# Patient Record
Sex: FEMALE | Race: WHITE | NOT HISPANIC OR LATINO | URBAN - METROPOLITAN AREA
[De-identification: names, ages, dates, MRNs, and addresses within clinical notes are randomized per-mention and may not be internally consistent; named-entity substitution may affect disease eponyms.]

---

## 2019-02-20 ENCOUNTER — OUTPATIENT (OUTPATIENT)
Dept: OUTPATIENT SERVICES | Facility: HOSPITAL | Age: 64
LOS: 1 days | End: 2019-02-20
Payer: MEDICARE

## 2019-02-20 VITALS
SYSTOLIC BLOOD PRESSURE: 100 MMHG | RESPIRATION RATE: 16 BRPM | OXYGEN SATURATION: 97 % | DIASTOLIC BLOOD PRESSURE: 57 MMHG | TEMPERATURE: 98 F | HEIGHT: 67 IN | HEART RATE: 87 BPM | WEIGHT: 190.92 LBS

## 2019-02-20 VITALS — HEIGHT: 67 IN | WEIGHT: 190.92 LBS

## 2019-02-20 DIAGNOSIS — Z98.890 OTHER SPECIFIED POSTPROCEDURAL STATES: Chronic | ICD-10-CM

## 2019-02-20 DIAGNOSIS — M21.962 UNSPECIFIED ACQUIRED DEFORMITY OF LEFT LOWER LEG: ICD-10-CM

## 2019-02-20 DIAGNOSIS — M21.6X2 OTHER ACQUIRED DEFORMITIES OF LEFT FOOT: ICD-10-CM

## 2019-02-20 DIAGNOSIS — M79.672 PAIN IN LEFT FOOT: ICD-10-CM

## 2019-02-20 DIAGNOSIS — M20.12 HALLUX VALGUS (ACQUIRED), LEFT FOOT: ICD-10-CM

## 2019-02-20 DIAGNOSIS — Z01.818 ENCOUNTER FOR OTHER PREPROCEDURAL EXAMINATION: ICD-10-CM

## 2019-02-20 LAB
ANION GAP SERPL CALC-SCNC: 8 MMOL/L — SIGNIFICANT CHANGE UP (ref 5–17)
BUN SERPL-MCNC: 14 MG/DL — SIGNIFICANT CHANGE UP (ref 7–23)
CALCIUM SERPL-MCNC: 9.1 MG/DL — SIGNIFICANT CHANGE UP (ref 8.4–10.5)
CHLORIDE SERPL-SCNC: 101 MMOL/L — SIGNIFICANT CHANGE UP (ref 96–108)
CO2 SERPL-SCNC: 29 MMOL/L — SIGNIFICANT CHANGE UP (ref 22–31)
CREAT SERPL-MCNC: 1.05 MG/DL — SIGNIFICANT CHANGE UP (ref 0.5–1.3)
GLUCOSE SERPL-MCNC: 98 MG/DL — SIGNIFICANT CHANGE UP (ref 70–99)
HCT VFR BLD CALC: 42.1 % — SIGNIFICANT CHANGE UP (ref 34.5–45)
HGB BLD-MCNC: 12.6 G/DL — SIGNIFICANT CHANGE UP (ref 11.5–15.5)
MCHC RBC-ENTMCNC: 24.3 PG — LOW (ref 27–34)
MCHC RBC-ENTMCNC: 30 GM/DL — LOW (ref 32–36)
MCV RBC AUTO: 81 FL — SIGNIFICANT CHANGE UP (ref 80–100)
PLATELET # BLD AUTO: 353 K/UL — SIGNIFICANT CHANGE UP (ref 150–400)
POTASSIUM SERPL-MCNC: 3.7 MMOL/L — SIGNIFICANT CHANGE UP (ref 3.5–5.3)
POTASSIUM SERPL-SCNC: 3.7 MMOL/L — SIGNIFICANT CHANGE UP (ref 3.5–5.3)
RBC # BLD: 5.2 M/UL — SIGNIFICANT CHANGE UP (ref 3.8–5.2)
RBC # FLD: 15.9 % — HIGH (ref 10.3–14.5)
SODIUM SERPL-SCNC: 138 MMOL/L — SIGNIFICANT CHANGE UP (ref 135–145)
WBC # BLD: 8.3 K/UL — SIGNIFICANT CHANGE UP (ref 3.8–10.5)
WBC # FLD AUTO: 8.3 K/UL — SIGNIFICANT CHANGE UP (ref 3.8–10.5)

## 2019-02-20 PROCEDURE — 93005 ELECTROCARDIOGRAM TRACING: CPT

## 2019-02-20 PROCEDURE — 93010 ELECTROCARDIOGRAM REPORT: CPT | Mod: NC

## 2019-02-20 PROCEDURE — 36415 COLL VENOUS BLD VENIPUNCTURE: CPT

## 2019-02-20 PROCEDURE — G0463: CPT

## 2019-02-20 PROCEDURE — 80048 BASIC METABOLIC PNL TOTAL CA: CPT

## 2019-02-20 PROCEDURE — 85027 COMPLETE CBC AUTOMATED: CPT

## 2019-02-20 NOTE — H&P PST ADULT - ATTENDING COMMENTS
Physician Assistant Statement 03-01-19  I have personally seen and interviewed the patient. There have NOT been any changes in the patient's history or review of systems since PMD visit.

## 2019-02-20 NOTE — H&P PST ADULT - PSH
History of facelift  2013  S/P bilateral breast reduction  09/2018  S/P bunionectomy  left foot 2012  S/P Hysterectomy  2006  S/P Tonsillectomy  1962  S/P Tubal Ligation  1996

## 2019-02-20 NOTE — H&P PST ADULT - HISTORY OF PRESENT ILLNESS
62 y/o female presents for PST. As per patient to deformities to left foot and toes she has been experiencing pain and spasm to left foot. Patient has been using Tylenol for pain which gives some relief.

## 2019-02-20 NOTE — H&P PST ADULT - RS GEN PE MLT RESP DETAILS PC
breath sounds equal/no chest wall tenderness/clear to auscultation bilaterally/respirations non-labored/airway patent/good air movement/normal

## 2019-02-20 NOTE — H&P PST ADULT - NEGATIVE CARDIOVASCULAR SYMPTOMS
no palpitations/no claudication/no orthopnea/no paroxysmal nocturnal dyspnea/no peripheral edema/no chest pain

## 2019-02-20 NOTE — H&P PST ADULT - FAMILY HISTORY
Mother  Still living? No  Family history of multiple myeloma, Age at diagnosis: Age Unknown  Family history of diabetes mellitus, Age at diagnosis: Age Unknown  Family history of hypertension, Age at diagnosis: Age Unknown     Father  Still living? No  Family history of hypertension, Age at diagnosis: Age Unknown  Family history of lung cancer, Age at diagnosis: Age Unknown

## 2019-02-20 NOTE — H&P PST ADULT - PMH
Anxiety    Deformity of foot, left    Depression, Major    Hyperlipidemia    Hypertension    Mitral Valve Prolapse    OCD (Obsessive Compulsive Disorder)    Uterine fibroid

## 2019-02-20 NOTE — H&P PST ADULT - NSANTHOSAYNRD_GEN_A_CORE
No. PABLITO screening performed.  STOP BANG Legend: 0-2 = LOW Risk; 3-4 = INTERMEDIATE Risk; 5-8 = HIGH Risk

## 2019-02-28 ENCOUNTER — TRANSCRIPTION ENCOUNTER (OUTPATIENT)
Age: 64
End: 2019-02-28

## 2019-03-01 ENCOUNTER — OUTPATIENT (OUTPATIENT)
Dept: OUTPATIENT SERVICES | Facility: HOSPITAL | Age: 64
LOS: 1 days | End: 2019-03-01
Payer: MEDICARE

## 2019-03-01 ENCOUNTER — RESULT REVIEW (OUTPATIENT)
Age: 64
End: 2019-03-01

## 2019-03-01 VITALS
WEIGHT: 190.92 LBS | HEART RATE: 85 BPM | OXYGEN SATURATION: 97 % | SYSTOLIC BLOOD PRESSURE: 104 MMHG | RESPIRATION RATE: 14 BRPM | DIASTOLIC BLOOD PRESSURE: 71 MMHG | HEIGHT: 67 IN | TEMPERATURE: 98 F

## 2019-03-01 VITALS
RESPIRATION RATE: 16 BRPM | OXYGEN SATURATION: 97 % | SYSTOLIC BLOOD PRESSURE: 128 MMHG | TEMPERATURE: 98 F | DIASTOLIC BLOOD PRESSURE: 77 MMHG | HEART RATE: 77 BPM

## 2019-03-01 DIAGNOSIS — Z98.890 OTHER SPECIFIED POSTPROCEDURAL STATES: Chronic | ICD-10-CM

## 2019-03-01 DIAGNOSIS — M79.672 PAIN IN LEFT FOOT: ICD-10-CM

## 2019-03-01 DIAGNOSIS — M20.12 HALLUX VALGUS (ACQUIRED), LEFT FOOT: ICD-10-CM

## 2019-03-01 DIAGNOSIS — M21.6X2 OTHER ACQUIRED DEFORMITIES OF LEFT FOOT: ICD-10-CM

## 2019-03-01 PROCEDURE — 88311 DECALCIFY TISSUE: CPT | Mod: 26

## 2019-03-01 PROCEDURE — C1713: CPT

## 2019-03-01 PROCEDURE — 73620 X-RAY EXAM OF FOOT: CPT | Mod: 26,LT

## 2019-03-01 PROCEDURE — C1889: CPT

## 2019-03-01 PROCEDURE — 28298 COR HLX VLGS PRX PHLX OSTEOT: CPT | Mod: LT

## 2019-03-01 PROCEDURE — 88311 DECALCIFY TISSUE: CPT

## 2019-03-01 PROCEDURE — 73620 X-RAY EXAM OF FOOT: CPT

## 2019-03-01 PROCEDURE — 88304 TISSUE EXAM BY PATHOLOGIST: CPT

## 2019-03-01 PROCEDURE — 28080 REMOVAL OF FOOT LESION: CPT | Mod: T1

## 2019-03-01 PROCEDURE — 88304 TISSUE EXAM BY PATHOLOGIST: CPT | Mod: 26

## 2019-03-01 PROCEDURE — 28285 REPAIR OF HAMMERTOE: CPT | Mod: T1

## 2019-03-01 RX ORDER — OXYCODONE HYDROCHLORIDE 5 MG/1
10 TABLET ORAL ONCE
Qty: 0 | Refills: 0 | Status: DISCONTINUED | OUTPATIENT
Start: 2019-03-01 | End: 2019-03-01

## 2019-03-01 RX ORDER — OMEGA-3 ACID ETHYL ESTERS 1 G
0 CAPSULE ORAL
Qty: 0 | Refills: 0 | COMMUNITY

## 2019-03-01 RX ORDER — SODIUM CHLORIDE 9 MG/ML
1000 INJECTION, SOLUTION INTRAVENOUS
Qty: 0 | Refills: 0 | Status: DISCONTINUED | OUTPATIENT
Start: 2019-03-01 | End: 2019-03-01

## 2019-03-01 RX ORDER — ONDANSETRON 8 MG/1
4 TABLET, FILM COATED ORAL ONCE
Qty: 0 | Refills: 0 | Status: DISCONTINUED | OUTPATIENT
Start: 2019-03-01 | End: 2019-03-01

## 2019-03-01 RX ADMIN — SODIUM CHLORIDE 50 MILLILITER(S): 9 INJECTION, SOLUTION INTRAVENOUS at 09:24

## 2019-03-01 NOTE — BRIEF OPERATIVE NOTE - PROCEDURE
<<-----Click on this checkbox to enter Procedure Arthroplasty, toe, IP joint  03/01/2019    Active  [email or web address]  Neuroma excision  03/01/2019    Active  [email or web address]  [place] osteotomy of left great toe  03/01/2019    Active  [email or web address]  Weil osteotomy of left foot  03/01/2019    Active  [email or web address]

## 2019-03-01 NOTE — ASU DISCHARGE PLAN (ADULT/PEDIATRIC). - NOTIFY
Bleeding that does not stop/Pain not relieved by Medications Persistent Nausea and Vomiting/Increased Irritability or Sluggishness/Numbness, tingling/Inability to Tolerate Liquids or Foods/Fever greater than 101/Bleeding that does not stop/Numbness, color, or temperature change to extremity/Pain not relieved by Medications/Swelling that continues

## 2019-03-01 NOTE — ASU DISCHARGE PLAN (ADULT/PEDIATRIC). - NURSING INSTRUCTIONS
all discharge safety follow up care to MD. Take all pain meds with food and not on an empty stomach.

## 2019-03-01 NOTE — BRIEF OPERATIVE NOTE - PRE-OP DX
Hammertoe of left foot  03/01/2019    Active  Ruchi Mckeon  Neuroma digital nerve, left  03/01/2019    Active  Ruchi Mckeon  Plantarflexion deformity of left foot  03/01/2019    Active  Ruchi Mckeon

## 2019-03-01 NOTE — ASU PATIENT PROFILE, ADULT - PMH
Anxiety    Deformity of foot, left    Depression, Major    Hyperlipidemia    Hypertension    Mitral Valve Prolapse    OCD (Obsessive Compulsive Disorder)    Uterine fibroid Anxiety    Bilateral carotid artery stenosis    Chronic back pain    Deformity of foot, left    Depression, Major    Fibromyalgia    GERD (gastroesophageal reflux disease)    Hyperlipidemia    Hypertension    Lung nodule    Mitral Valve Prolapse    OCD (Obsessive Compulsive Disorder)    Uterine fibroid

## 2019-03-04 LAB — SURGICAL PATHOLOGY STUDY: SIGNIFICANT CHANGE UP

## 2019-05-03 PROBLEM — I65.23 OCCLUSION AND STENOSIS OF BILATERAL CAROTID ARTERIES: Chronic | Status: ACTIVE | Noted: 2019-03-01

## 2019-05-03 PROBLEM — D25.9 LEIOMYOMA OF UTERUS, UNSPECIFIED: Chronic | Status: ACTIVE | Noted: 2019-02-20

## 2019-05-03 PROBLEM — M54.9 DORSALGIA, UNSPECIFIED: Chronic | Status: ACTIVE | Noted: 2019-03-01

## 2019-05-03 PROBLEM — M21.962 UNSPECIFIED ACQUIRED DEFORMITY OF LEFT LOWER LEG: Chronic | Status: ACTIVE | Noted: 2019-02-20

## 2019-05-03 PROBLEM — R91.1 SOLITARY PULMONARY NODULE: Chronic | Status: ACTIVE | Noted: 2019-03-01

## 2019-05-03 PROBLEM — M79.7 FIBROMYALGIA: Chronic | Status: ACTIVE | Noted: 2019-03-01

## 2019-05-03 PROBLEM — K21.9 GASTRO-ESOPHAGEAL REFLUX DISEASE WITHOUT ESOPHAGITIS: Chronic | Status: ACTIVE | Noted: 2019-03-01

## 2019-05-07 ENCOUNTER — OUTPATIENT (OUTPATIENT)
Dept: OUTPATIENT SERVICES | Facility: HOSPITAL | Age: 64
LOS: 1 days | End: 2019-05-07
Payer: MEDICARE

## 2019-05-07 VITALS
SYSTOLIC BLOOD PRESSURE: 114 MMHG | HEART RATE: 92 BPM | WEIGHT: 193.12 LBS | RESPIRATION RATE: 16 BRPM | TEMPERATURE: 98 F | DIASTOLIC BLOOD PRESSURE: 73 MMHG | HEIGHT: 66 IN | OXYGEN SATURATION: 98 %

## 2019-05-07 DIAGNOSIS — M65.872 OTHER SYNOVITIS AND TENOSYNOVITIS, LEFT ANKLE AND FOOT: ICD-10-CM

## 2019-05-07 DIAGNOSIS — Z01.818 ENCOUNTER FOR OTHER PREPROCEDURAL EXAMINATION: ICD-10-CM

## 2019-05-07 DIAGNOSIS — Z98.890 OTHER SPECIFIED POSTPROCEDURAL STATES: Chronic | ICD-10-CM

## 2019-05-07 DIAGNOSIS — Z47.2 ENCOUNTER FOR REMOVAL OF INTERNAL FIXATION DEVICE: ICD-10-CM

## 2019-05-07 LAB
ANION GAP SERPL CALC-SCNC: 6 MMOL/L — SIGNIFICANT CHANGE UP (ref 5–17)
BUN SERPL-MCNC: 11 MG/DL — SIGNIFICANT CHANGE UP (ref 7–23)
CALCIUM SERPL-MCNC: 9.7 MG/DL — SIGNIFICANT CHANGE UP (ref 8.4–10.5)
CHLORIDE SERPL-SCNC: 102 MMOL/L — SIGNIFICANT CHANGE UP (ref 96–108)
CO2 SERPL-SCNC: 31 MMOL/L — SIGNIFICANT CHANGE UP (ref 22–31)
CREAT SERPL-MCNC: 0.7 MG/DL — SIGNIFICANT CHANGE UP (ref 0.5–1.3)
GLUCOSE SERPL-MCNC: 114 MG/DL — HIGH (ref 70–99)
HCT VFR BLD CALC: 43.1 % — SIGNIFICANT CHANGE UP (ref 34.5–45)
HGB BLD-MCNC: 13 G/DL — SIGNIFICANT CHANGE UP (ref 11.5–15.5)
MCHC RBC-ENTMCNC: 24.4 PG — LOW (ref 27–34)
MCHC RBC-ENTMCNC: 30.2 GM/DL — LOW (ref 32–36)
MCV RBC AUTO: 80.8 FL — SIGNIFICANT CHANGE UP (ref 80–100)
PLATELET # BLD AUTO: 342 K/UL — SIGNIFICANT CHANGE UP (ref 150–400)
POTASSIUM SERPL-MCNC: 3.8 MMOL/L — SIGNIFICANT CHANGE UP (ref 3.5–5.3)
POTASSIUM SERPL-SCNC: 3.8 MMOL/L — SIGNIFICANT CHANGE UP (ref 3.5–5.3)
RBC # BLD: 5.33 M/UL — HIGH (ref 3.8–5.2)
RBC # FLD: 14.2 % — SIGNIFICANT CHANGE UP (ref 10.3–14.5)
SODIUM SERPL-SCNC: 139 MMOL/L — SIGNIFICANT CHANGE UP (ref 135–145)
WBC # BLD: 7.8 K/UL — SIGNIFICANT CHANGE UP (ref 3.8–10.5)
WBC # FLD AUTO: 7.8 K/UL — SIGNIFICANT CHANGE UP (ref 3.8–10.5)

## 2019-05-07 PROCEDURE — 36415 COLL VENOUS BLD VENIPUNCTURE: CPT

## 2019-05-07 PROCEDURE — 85027 COMPLETE CBC AUTOMATED: CPT

## 2019-05-07 PROCEDURE — G0463: CPT

## 2019-05-07 PROCEDURE — 80048 BASIC METABOLIC PNL TOTAL CA: CPT

## 2019-05-07 NOTE — H&P PST ADULT - NSICDXPROBLEM_GEN_ALL_CORE_FT
PROBLEM DIAGNOSES  Problem: Encounter for removal of internal fixation device  Assessment and Plan: Patient schedule for left foot removal of hardware 2nd metatarsal. Labs and medical clearance pending.

## 2019-05-07 NOTE — H&P PST ADULT - RS GEN PE MLT RESP DETAILS PC
no chest wall tenderness/respirations non-labored/airway patent/normal/clear to auscultation bilaterally/good air movement/breath sounds equal

## 2019-05-07 NOTE — H&P PST ADULT - NSICDXPASTMEDICALHX_GEN_ALL_CORE_FT
PAST MEDICAL HISTORY:  Anxiety     Bilateral carotid artery stenosis     Chronic back pain     Deformity of foot, left     Depression, Major     Fibromyalgia     GERD (gastroesophageal reflux disease)     Hyperlipidemia     Hypertension     Lung nodule     Mitral Valve Prolapse     OCD (Obsessive Compulsive Disorder)     Uterine fibroid

## 2019-05-07 NOTE — H&P PST ADULT - NSICDXFAMILYHX_GEN_ALL_CORE_FT
FAMILY HISTORY:  Father  Still living? No  Family history of hypertension, Age at diagnosis: Age Unknown  Family history of lung cancer, Age at diagnosis: Age Unknown    Mother  Still living? No  Family history of diabetes mellitus, Age at diagnosis: Age Unknown  Family history of hypertension, Age at diagnosis: Age Unknown  Family history of multiple myeloma, Age at diagnosis: Age Unknown

## 2019-05-07 NOTE — H&P PST ADULT - NEUROLOGICAL DETAILS
alert and oriented x 3/responds to pain/responds to verbal commands/no spontaneous movement/sensation intact/cranial nerves intact/deep reflexes intact

## 2019-05-07 NOTE — H&P PST ADULT - HISTORY OF PRESENT ILLNESS
62 y/o female presents for PST. As per patient she had left foot surgery due to deformities of left foot on 03/01/2019. Patient is now returning to have hardware removed.

## 2019-05-07 NOTE — H&P PST ADULT - NSICDXPASTSURGICALHX_GEN_ALL_CORE_FT
PAST SURGICAL HISTORY:  History of facelift 2013    S/P bilateral breast reduction 09/2018    S/P bunionectomy left foot 2012    S/P Hysterectomy 2006    S/P Tonsillectomy 1962    S/P Tubal Ligation 1996

## 2019-05-16 ENCOUNTER — TRANSCRIPTION ENCOUNTER (OUTPATIENT)
Age: 64
End: 2019-05-16

## 2019-05-17 ENCOUNTER — RESULT REVIEW (OUTPATIENT)
Age: 64
End: 2019-05-17

## 2019-05-17 ENCOUNTER — OUTPATIENT (OUTPATIENT)
Dept: OUTPATIENT SERVICES | Facility: HOSPITAL | Age: 64
LOS: 1 days | End: 2019-05-17
Payer: MEDICARE

## 2019-05-17 VITALS
HEART RATE: 90 BPM | RESPIRATION RATE: 18 BRPM | WEIGHT: 193.12 LBS | HEIGHT: 66 IN | TEMPERATURE: 98 F | DIASTOLIC BLOOD PRESSURE: 71 MMHG | SYSTOLIC BLOOD PRESSURE: 113 MMHG | OXYGEN SATURATION: 96 %

## 2019-05-17 VITALS
OXYGEN SATURATION: 99 % | DIASTOLIC BLOOD PRESSURE: 74 MMHG | TEMPERATURE: 97 F | SYSTOLIC BLOOD PRESSURE: 116 MMHG | RESPIRATION RATE: 16 BRPM | HEART RATE: 80 BPM

## 2019-05-17 DIAGNOSIS — Z98.890 OTHER SPECIFIED POSTPROCEDURAL STATES: Chronic | ICD-10-CM

## 2019-05-17 DIAGNOSIS — Z47.2 ENCOUNTER FOR REMOVAL OF INTERNAL FIXATION DEVICE: ICD-10-CM

## 2019-05-17 DIAGNOSIS — M65.872 OTHER SYNOVITIS AND TENOSYNOVITIS, LEFT ANKLE AND FOOT: ICD-10-CM

## 2019-05-17 PROCEDURE — 88304 TISSUE EXAM BY PATHOLOGIST: CPT | Mod: 26

## 2019-05-17 PROCEDURE — 20670 REMOVAL IMPLANT SUPERFICIAL: CPT

## 2019-05-17 PROCEDURE — 88305 TISSUE EXAM BY PATHOLOGIST: CPT | Mod: 26,XP

## 2019-05-17 PROCEDURE — 88300 SURGICAL PATH GROSS: CPT | Mod: 26

## 2019-05-17 PROCEDURE — 88305 TISSUE EXAM BY PATHOLOGIST: CPT

## 2019-05-17 PROCEDURE — 88304 TISSUE EXAM BY PATHOLOGIST: CPT | Mod: 26,XP

## 2019-05-17 PROCEDURE — 88300 SURGICAL PATH GROSS: CPT | Mod: 26,XP

## 2019-05-17 PROCEDURE — 73620 X-RAY EXAM OF FOOT: CPT

## 2019-05-17 PROCEDURE — 88305 TISSUE EXAM BY PATHOLOGIST: CPT | Mod: 26

## 2019-05-17 PROCEDURE — 73620 X-RAY EXAM OF FOOT: CPT | Mod: 26,LT

## 2019-05-17 PROCEDURE — 88300 SURGICAL PATH GROSS: CPT

## 2019-05-17 RX ORDER — CHOLECALCIFEROL (VITAMIN D3) 125 MCG
1 CAPSULE ORAL
Qty: 0 | Refills: 0 | DISCHARGE

## 2019-05-17 RX ORDER — CLOMIPRAMINE HYDROCHLORIDE 50 MG/1
1 CAPSULE ORAL
Qty: 0 | Refills: 0 | DISCHARGE

## 2019-05-17 RX ORDER — ALPRAZOLAM 0.25 MG
1 TABLET ORAL
Qty: 0 | Refills: 0 | DISCHARGE

## 2019-05-17 RX ORDER — ACETAMINOPHEN 500 MG
2 TABLET ORAL
Qty: 0 | Refills: 0 | DISCHARGE

## 2019-05-17 RX ORDER — SODIUM CHLORIDE 9 MG/ML
1000 INJECTION, SOLUTION INTRAVENOUS
Refills: 0 | Status: DISCONTINUED | OUTPATIENT
Start: 2019-05-17 | End: 2019-05-17

## 2019-05-17 RX ORDER — L.ACIDOPH/B.ANIMALIS/B.LONGUM 15B CELL
1 CAPSULE ORAL
Qty: 0 | Refills: 0 | DISCHARGE

## 2019-05-17 RX ORDER — SIMVASTATIN 20 MG/1
1 TABLET, FILM COATED ORAL
Qty: 0 | Refills: 0 | DISCHARGE

## 2019-05-17 RX ORDER — LOSARTAN/HYDROCHLOROTHIAZIDE 100MG-25MG
1 TABLET ORAL
Qty: 0 | Refills: 0 | DISCHARGE

## 2019-05-17 RX ORDER — MILK THISTLE 150 MG
1 CAPSULE ORAL
Qty: 0 | Refills: 0 | DISCHARGE

## 2019-05-17 RX ORDER — OMEPRAZOLE 10 MG/1
1 CAPSULE, DELAYED RELEASE ORAL
Qty: 0 | Refills: 0 | DISCHARGE

## 2019-05-17 RX ORDER — LANOLIN ALCOHOL/MO/W.PET/CERES
2 CREAM (GRAM) TOPICAL
Qty: 0 | Refills: 0 | DISCHARGE

## 2019-05-17 RX ORDER — DIVALPROEX SODIUM 500 MG/1
1 TABLET, DELAYED RELEASE ORAL
Qty: 0 | Refills: 0 | DISCHARGE

## 2019-05-17 RX ORDER — ASCORBIC ACID 60 MG
1 TABLET,CHEWABLE ORAL
Qty: 0 | Refills: 0 | DISCHARGE

## 2019-05-17 RX ORDER — ASPIRIN/CALCIUM CARB/MAGNESIUM 324 MG
1 TABLET ORAL
Qty: 0 | Refills: 0 | DISCHARGE

## 2019-05-17 RX ORDER — HYDROMORPHONE HYDROCHLORIDE 2 MG/ML
0.5 INJECTION INTRAMUSCULAR; INTRAVENOUS; SUBCUTANEOUS
Refills: 0 | Status: DISCONTINUED | OUTPATIENT
Start: 2019-05-17 | End: 2019-05-17

## 2019-05-17 RX ORDER — PROGESTERONE 200 MG/1
0 CAPSULE, LIQUID FILLED ORAL
Qty: 0 | Refills: 0 | DISCHARGE

## 2019-05-17 RX ADMIN — SODIUM CHLORIDE 50 MILLILITER(S): 9 INJECTION, SOLUTION INTRAVENOUS at 12:00

## 2019-05-17 NOTE — ASU DISCHARGE PLAN (ADULT/PEDIATRIC) - NURSING INSTRUCTIONS
All of discharge, safety, pain management, cane instruction follow up with surgeon. Verbalized understanding of all instructions.

## 2019-05-17 NOTE — BRIEF OPERATIVE NOTE - NSICDXBRIEFPROCEDURE_GEN_ALL_CORE_FT
PROCEDURES:  Synovectomy, MTP joint 17-May-2019 13:23:08  Anderson Donald  Removal, hardware, extremity 17-May-2019 13:22:51  Anderson Donald

## 2019-05-17 NOTE — ASU PATIENT PROFILE, ADULT - PMH
Anxiety    Bilateral carotid artery stenosis    Chronic back pain    Deformity of foot, left    Depression, Major    Fibromyalgia    GERD (gastroesophageal reflux disease)    Hyperlipidemia    Hypertension    Lung nodule    Mitral Valve Prolapse    OCD (Obsessive Compulsive Disorder)    Uterine fibroid

## 2019-05-17 NOTE — BRIEF OPERATIVE NOTE - NSICDXBRIEFPREOP_GEN_ALL_CORE_FT
PRE-OP DIAGNOSIS:  Synovitis and tenosynovitis 17-May-2019 13:23:52  Anderson Donald  Fixation hardware in foot 17-May-2019 13:23:26  Anderson Donald

## 2019-05-17 NOTE — ASU DISCHARGE PLAN (ADULT/PEDIATRIC) - CARE PROVIDER_API CALL
Anderson Donald (DPM)  Podiatric Medicine and Surgery  [address] [place] [address]  Phone: [phone]  Fax: [phone]  Follow Up Time:

## 2019-05-17 NOTE — BRIEF OPERATIVE NOTE - NSICDXBRIEFPOSTOP_GEN_ALL_CORE_FT
POST-OP DIAGNOSIS:  Transient synovitis, ankle and foot 17-May-2019 13:24:35  Anderson Donald  Fixation hardware in foot 17-May-2019 13:24:08  Anderson Donald

## 2019-05-17 NOTE — ASU DISCHARGE PLAN (ADULT/PEDIATRIC) - CALL YOUR DOCTOR IF YOU HAVE ANY OF THE FOLLOWING:
Fever greater than (need to indicate Fahrenheit or Celsius)/Wound/Surgical Site with redness, or foul smelling discharge or pus/Unable to urinate/Inability to tolerate liquids or foods/Excessive diarrhea/Pain not relieved by Medications/Numbness, tingling, color or temperature change to extremity/Increased irritability or sluggishness/Nausea and vomiting that does not stop/Bleeding that does not stop/Swelling that gets worse

## 2019-05-17 NOTE — ASU PATIENT PROFILE, ADULT - VISION (WITH CORRECTIVE LENSES IF THE PATIENT USUALLY WEARS THEM):
Normal vision: sees adequately in most situations; can see medication labels, newsprint reading glasses only/Normal vision: sees adequately in most situations; can see medication labels, newsprint

## 2019-05-20 LAB — SURGICAL PATHOLOGY STUDY: SIGNIFICANT CHANGE UP

## 2019-09-27 NOTE — H&P PST ADULT - PRETERM DELIVERIES, OB PROFILE
[Asthma] : asthma [Allergic Rhinitis] : allergic rhinitis [Eczematous rashes] : eczematous rashes [Venom Reactions] : venom reactions [Food Allergies] : food allergies [de-identified] : 2016 patient she felt a sting on her right wrist - with hand and wrist swelling - she saw PCP and she was treated with prednisone.   She consulted with Dr. Richards.   About one week later recurrent symptoms and treated with prednisone. Her symptoms resolved and she has had recurrence of her symptoms over the past 6 weeks.   She noted bilateral swelling of soles of her feet.   She consulted with podiatrist and treated with prednisone.   Patient restarted back on Zyrtec 20 mg QID and off the medications her symptoms recurred.   She restarted back on the Zyrtec and her symptoms have been controlled and she has a tapering schedule.    \par \par Patient was taking Avodart for hair loss x 8 years - she discontinued medication in 2016.   She restarted the medication x 18 months ago.   \par \par Mother - bullous pemphigoid.    No children with angioedema.  \par \par Patient expressed concern about ingestion causing her symptoms.    She stopped eating fruit in 2016.    0

## 2021-01-16 ENCOUNTER — TRANSCRIPTION ENCOUNTER (OUTPATIENT)
Age: 66
End: 2021-01-16

## 2023-12-06 ENCOUNTER — APPOINTMENT (OUTPATIENT)
Dept: AFTER HOURS CARE | Facility: EMERGENCY ROOM | Age: 68
End: 2023-12-06
Payer: SELF-PAY

## 2023-12-06 ENCOUNTER — TRANSCRIPTION ENCOUNTER (OUTPATIENT)
Age: 68
End: 2023-12-06

## 2023-12-07 PROCEDURE — 99205 OFFICE O/P NEW HI 60 MIN: CPT | Mod: NC,95

## 2024-01-08 ENCOUNTER — APPOINTMENT (OUTPATIENT)
Dept: UROGYNECOLOGY | Facility: CLINIC | Age: 69
End: 2024-01-08
Payer: MEDICARE

## 2024-01-08 VITALS
WEIGHT: 189 LBS | BODY MASS INDEX: 29.66 KG/M2 | SYSTOLIC BLOOD PRESSURE: 156 MMHG | HEART RATE: 99 BPM | DIASTOLIC BLOOD PRESSURE: 80 MMHG | HEIGHT: 67 IN

## 2024-01-08 DIAGNOSIS — R35.0 FREQUENCY OF MICTURITION: ICD-10-CM

## 2024-01-08 DIAGNOSIS — N95.2 POSTMENOPAUSAL ATROPHIC VAGINITIS: ICD-10-CM

## 2024-01-08 DIAGNOSIS — N39.46 MIXED INCONTINENCE: ICD-10-CM

## 2024-01-08 DIAGNOSIS — N95.8 OTHER SPECIFIED MENOPAUSAL AND PERIMENOPAUSAL DISORDERS: ICD-10-CM

## 2024-01-08 LAB
BILIRUB UR QL STRIP: NORMAL
CLARITY UR: CLEAR
COLLECTION METHOD: NORMAL
GLUCOSE UR-MCNC: NORMAL
HCG UR QL: 0.2 EU/DL
HGB UR QL STRIP.AUTO: NORMAL
KETONES UR-MCNC: NORMAL
LEUKOCYTE ESTERASE UR QL STRIP: NORMAL
NITRITE UR QL STRIP: NORMAL
PH UR STRIP: 6
PROT UR STRIP-MCNC: NORMAL
SP GR UR STRIP: 1

## 2024-01-08 PROCEDURE — 51701 INSERT BLADDER CATHETER: CPT

## 2024-01-08 PROCEDURE — 99204 OFFICE O/P NEW MOD 45 MIN: CPT | Mod: 25

## 2024-01-08 PROCEDURE — 81003 URINALYSIS AUTO W/O SCOPE: CPT | Mod: QW

## 2024-01-08 RX ORDER — ESTRADIOL 10 UG/1
10 TABLET, FILM COATED VAGINAL
Qty: 28 | Refills: 1 | Status: ACTIVE | COMMUNITY
Start: 2024-01-08 | End: 1900-01-01

## 2024-01-18 DIAGNOSIS — Z83.49 FAMILY HISTORY OF OTHER ENDOCRINE, NUTRITIONAL AND METABOLIC DISEASES: ICD-10-CM

## 2024-01-18 DIAGNOSIS — Z80.1 FAMILY HISTORY OF MALIGNANT NEOPLASM OF TRACHEA, BRONCHUS AND LUNG: ICD-10-CM

## 2024-01-18 DIAGNOSIS — Z86.79 PERSONAL HISTORY OF OTHER DISEASES OF THE CIRCULATORY SYSTEM: ICD-10-CM

## 2024-01-18 DIAGNOSIS — Z86.39 PERSONAL HISTORY OF OTHER ENDOCRINE, NUTRITIONAL AND METABOLIC DISEASE: ICD-10-CM

## 2024-01-18 DIAGNOSIS — Z82.49 FAMILY HISTORY OF ISCHEMIC HEART DISEASE AND OTHER DISEASES OF THE CIRCULATORY SYSTEM: ICD-10-CM

## 2024-01-18 DIAGNOSIS — Z86.59 PERSONAL HISTORY OF OTHER MENTAL AND BEHAVIORAL DISORDERS: ICD-10-CM

## 2024-01-18 DIAGNOSIS — Z87.01 PERSONAL HISTORY OF PNEUMONIA (RECURRENT): ICD-10-CM

## 2024-01-18 DIAGNOSIS — Z87.891 PERSONAL HISTORY OF NICOTINE DEPENDENCE: ICD-10-CM

## 2024-01-18 DIAGNOSIS — Z80.7 FAMILY HISTORY OF OTHER MALIGNANT NEOPLASMS OF LYMPHOID, HEMATOPOIETIC AND RELATED TISSUES: ICD-10-CM

## 2024-01-18 DIAGNOSIS — Z81.8 FAMILY HISTORY OF OTHER MENTAL AND BEHAVIORAL DISORDERS: ICD-10-CM

## 2024-01-18 DIAGNOSIS — I34.0 NONRHEUMATIC MITRAL (VALVE) INSUFFICIENCY: ICD-10-CM

## 2024-01-18 DIAGNOSIS — Z83.3 FAMILY HISTORY OF DIABETES MELLITUS: ICD-10-CM

## 2024-01-18 DIAGNOSIS — Z83.42 FAMILY HISTORY OF FAMILIAL HYPERCHOLESTEROLEMIA: ICD-10-CM

## 2024-01-18 DIAGNOSIS — Z63.4 DISAPPEARANCE AND DEATH OF FAMILY MEMBER: ICD-10-CM

## 2024-01-18 DIAGNOSIS — Z87.828 PERSONAL HISTORY OF OTHER (HEALED) PHYSICAL INJURY AND TRAUMA: ICD-10-CM

## 2024-01-18 RX ORDER — CLOMIPRAMINE HYDROCHLORIDE 75 MG/1
75 CAPSULE ORAL
Refills: 0 | Status: ACTIVE | COMMUNITY

## 2024-01-18 RX ORDER — DIVALPROEX SODIUM 500 MG/1
500 TABLET, DELAYED RELEASE ORAL
Refills: 0 | Status: ACTIVE | COMMUNITY

## 2024-01-18 RX ORDER — LOSARTAN POTASSIUM AND HYDROCHLOROTHIAZIDE 12.5; 1 MG/1; MG/1
TABLET ORAL
Refills: 0 | Status: ACTIVE | COMMUNITY

## 2024-01-18 RX ORDER — OMEPRAZOLE 20 MG/1
20 CAPSULE, DELAYED RELEASE ORAL
Refills: 0 | Status: ACTIVE | COMMUNITY

## 2024-01-18 RX ORDER — MULTIVITAMIN,THER AND MINERALS
TABLET ORAL
Refills: 0 | Status: ACTIVE | COMMUNITY

## 2024-01-18 RX ORDER — ALPRAZOLAM 0.25 MG/1
0.25 TABLET ORAL
Refills: 0 | Status: ACTIVE | COMMUNITY

## 2024-01-18 RX ORDER — SIMVASTATIN 20 MG/1
20 TABLET, FILM COATED ORAL
Refills: 0 | Status: ACTIVE | COMMUNITY

## 2024-01-18 RX ORDER — FAMOTIDINE 40 MG/1
40 TABLET, FILM COATED ORAL
Refills: 0 | Status: ACTIVE | COMMUNITY

## 2024-01-18 RX ORDER — ASPIRIN 81 MG
81 TABLET, DELAYED RELEASE (ENTERIC COATED) ORAL
Refills: 0 | Status: ACTIVE | COMMUNITY

## 2024-01-18 SDOH — SOCIAL STABILITY - SOCIAL INSECURITY: DISSAPEARANCE AND DEATH OF FAMILY MEMBER: Z63.4

## 2024-01-29 ENCOUNTER — APPOINTMENT (OUTPATIENT)
Dept: UROGYNECOLOGY | Facility: CLINIC | Age: 69
End: 2024-01-29

## 2024-03-07 ENCOUNTER — APPOINTMENT (OUTPATIENT)
Dept: ORTHOPEDIC SURGERY | Facility: CLINIC | Age: 69
End: 2024-03-07
Payer: MEDICARE

## 2024-03-07 PROCEDURE — 72040 X-RAY EXAM NECK SPINE 2-3 VW: CPT

## 2024-03-07 PROCEDURE — 72100 X-RAY EXAM L-S SPINE 2/3 VWS: CPT

## 2024-03-07 PROCEDURE — 99213 OFFICE O/P EST LOW 20 MIN: CPT

## 2024-03-07 PROCEDURE — 73030 X-RAY EXAM OF SHOULDER: CPT | Mod: RT

## 2024-03-07 NOTE — PHYSICAL EXAM
[Flexion] : flexion [Extension] : extension [Right] : right shoulder [] : motor and sensory intact distally

## 2024-03-07 NOTE — HISTORY OF PRESENT ILLNESS
[Neck] : neck [Lower back] : lower back [Right Arm] : right arm [Gradual] : gradual [Dull/Aching] : dull/aching [7] : 7 [Sharp] : sharp [Stabbing] : stabbing [Radiating] : radiating [Household chores] : household chores [Constant] : constant [Work] : work [Leisure] : leisure [Rest] : rest [Full time] : Work status: full time [de-identified] : 67yo RHD F with neck, lower back, and right shoulder pain that has been worsening over the past months with no injury. She has been treated in the past with PT, acupuncture, and 2 week trial of narcotics. She had also done Toradol in the past with mild relief. Was treated ~4 years ago. She is unable to tolerate steroids due to hx of OCD and depression.  [] : no [FreeTextEntry3] : 3/4/24 [FreeTextEntry5] : OVERUSE  [FreeTextEntry7] : DOWN RIGHT ARM  [de-identified] :  JOES

## 2024-03-07 NOTE — DISCUSSION/SUMMARY
[de-identified] : The patient was advised of the diagnosis.  The natural history of the pathology was explained in full to the patient in layman's terms. All questions were answered.  The risks and benefits of surgical and non-surgical treatment alternatives were explained in full to the patient.  Patient counseled to utilize NSAIDs on an as needed basis. Medication should be taken with food and/or PPI if applicable. NSAID therapy should be used at lowest effective dose for the shortest duration possible.

## 2024-03-07 NOTE — ASSESSMENT
[FreeTextEntry1] : 68F with cervical DDD and lumbar DDD  Meloxicam rx Will obtain MRI of her C-spine and L-spine Unable to have cortisone injection due to hx of OCD- notes adverse reaction to MDP in the past.  Referral to pain management to consider ablation

## 2024-03-08 ENCOUNTER — APPOINTMENT (OUTPATIENT)
Dept: MRI IMAGING | Facility: CLINIC | Age: 69
End: 2024-03-08
Payer: MEDICARE

## 2024-03-08 PROCEDURE — 72148 MRI LUMBAR SPINE W/O DYE: CPT | Mod: MH

## 2024-03-08 PROCEDURE — 72141 MRI NECK SPINE W/O DYE: CPT | Mod: MH

## 2024-03-11 ENCOUNTER — APPOINTMENT (OUTPATIENT)
Dept: UROGYNECOLOGY | Facility: CLINIC | Age: 69
End: 2024-03-11

## 2024-04-09 ENCOUNTER — APPOINTMENT (OUTPATIENT)
Dept: PAIN MANAGEMENT | Facility: CLINIC | Age: 69
End: 2024-04-09
Payer: MEDICARE

## 2024-04-09 VITALS — HEIGHT: 67 IN | BODY MASS INDEX: 29.03 KG/M2 | WEIGHT: 185 LBS

## 2024-04-09 DIAGNOSIS — M47.812 SPONDYLOSIS W/OUT MYELOPATHY OR RADICULOPATHY, CERVICAL REGION: ICD-10-CM

## 2024-04-09 DIAGNOSIS — Z87.39 PERSONAL HISTORY OF OTHER DISEASES OF THE MUSCULOSKELETAL SYSTEM AND CONNECTIVE TISSUE: ICD-10-CM

## 2024-04-09 PROCEDURE — 99204 OFFICE O/P NEW MOD 45 MIN: CPT

## 2024-04-09 RX ORDER — PREGABALIN 75 MG/1
75 CAPSULE ORAL TWICE DAILY
Qty: 60 | Refills: 1 | Status: ACTIVE | COMMUNITY
Start: 2024-04-09 | End: 1900-01-01

## 2024-04-09 NOTE — ASSESSMENT
[FreeTextEntry1] : A discussion regarding available pain management treatment options occurred with the patient.  These included interventional, rehabilitative, pharmacological, and alternative modalities. We will proceed with the following:    Interventional treatment options:   - Discussed trial of lumbar facet MBB/RFA for ongoing axial low back pain - At this time given the diffuse nature of her pain I advised that she hold off - She would be candidate for a cervical epidural steroid injection for her radicular arm pain however patient is unable to have corticosteroid (previous severe exacerbations of OCD and other psychological negative effects) - see additional instructions below    Rehabilitative options:   - Patient failed previous trials of physical therapy; she defers further - participation in active HEP was discussed and encouraged as tolerated  Medication based treatment options:   - initiate trial of Lyrica 75 mg BID; further titration based on clinical response - continue Tylenol 500-1000 mg up to TID as needed - Advised against use of NSAID therapy given reported bleeding effects - see additional instructions below    Complementary treatment options:   - Weight management and lifestyle modifications discussed   - Patient considering retrial of chiropractic care  Additional treatment recommendations as follows:   - Patient will follow-up in 6-8 weeks  The documentation recorded by the scribe, in my presence, accurately reflects the service I personally performed and the decisions made by me with my edits as appropriate.   I, Miller Briceno acting as scribe, attest that this documentation has been prepared under the direction and in the presence of Provider Roby Dwyer DO.

## 2024-04-09 NOTE — PHYSICAL EXAM
[de-identified] : Constitutional:   - No acute distress   - Well developed; well nourished    Neurological:   - normal mood and affect   - alert and oriented x 3     Cardiovascular:   - grossly normal   Cervical Spine Exam:   Inspection:   erythema (-)   ecchymosis (-)   rashes (-)    Palpation:                                                    Cervical paraspinal tenderness:         R (-); L(-)  Upper trapezius tenderness:              R (-); L (-)  Rhomboids tenderness:                      R (-); L (-)  Occipital Ridge:                                    R (-); L (-)  Supraspinatus tenderness:                 R (-); L (-)   ROM: WNL crepitus throughout ROM testing  Strength Testing:              Deltoid                           R (5/5); L (5/5)  Biceps:                          R (5/5); L (5/5)  Triceps:                         R (5/5); L (5/5)  Finger Abductors:         R (5/5); L (5/5)  Grasp:                           R (5/5); L (5/5)   Special Testing:  Spurling Test:                  R (+); L (-)  Facet load test:               R (+); L (+)   Neuro:  SILT throughout right upper extremity  SILT throughout left upper extremity   Reflexes:  Biceps   -           R (2+); L (2+)  Triceps  -           R (2+); L (2+)  Brachioradialis- R (2+); L (2+)     No ankle clonus   -------------------------------------------------------------------  Lumbar Spine Exam:   Inspection:  erythema (-)  ecchymosis (-)  rashes (-)  alignment: no scoliosis   Palpation:  Midline lumbar tenderness:            (-)  midline thoracic tenderness:          (-)  Lumbar paraspinal tenderness:  L (-) ; R (-)  thoracic paraspinal tenderness: L (-) ; R (-)  sciatic nerve tenderness :          L (-) ; R (-)  SI joint tenderness:                     L (-) ; R (-)  GTB tenderness:                        L (-);  R (-)   ROM:   Strength:                                     Right       Left     Hip Flexion:                (5/5)       (5/5)  Quadriceps:               (5/5)       (5/5)  Hamstrings:                (5/5)       (5/5)  Ankle Dorsiflexion:     (5/5)       (5/5)  EHL:                           (5/5)       (5/5)  Ankle Plantarflexion:  (5/5)       (5/5)   Special Tests:  SLR:                            R (=) ; L (=)  Facet loading:             R (+) ; L (+)  VERONICA test:                R (-) ; L (-)  Hamstring tightness:   R (-);  L (-)   Neurologic:  SILT throughout right lower extremity  SILT throughout left lower extremity   Reflexes normal and symmetric bilateral lower extremities   Gait:  non- antalgic gait  ambulates without assistive device

## 2024-04-09 NOTE — HISTORY OF PRESENT ILLNESS
[Neck] : neck [Upper back] : upper back [Mid-back] : mid-back [Lower back] : lower back [Sudden] : sudden [Dull/Aching] : dull/aching [5] : 5 [Intermittent] : intermittent [FreeTextEntry1] : The patient presents for initial evaluation regarding their neck pain and low back pain.  Patient was seen approxi-1 month ago in urgent care and referred over.  Patient complains of multifocal pain along the entire spine and upper extremities, her main complaint today is her mid back pain. Patient has a history of GI ulcers and had rectal bleeding while on Meloxicam which resolved with discontinued use, she still uses Tylenol and Ibuprofen PRN for pain management; she also has an "allergy" to corticosteroids. Patient has completed PT trials with no perceived benefit, and has received chiropractic care with good relief s/p.   Subjective Weakness: No  Numbness/Tingling: Yes  Bladder/Bowel dysfunction: No Gait Abnormalities: No  Fine motor coordination changes: No   Injections: No    Pertinent Surgical History: N/A   Imagin) MRI Cervical Spine (3/7/2024) - [facility]   2) MRI Lumbar Spine (3/7/2024) - [facility]  Physician Disclaimer: I have personally reviewed and confirmed all HPI data with the patient.  [] : Patient is currently injured and not playing sports: no [de-identified] : [facility]

## 2024-04-09 NOTE — REASON FOR VISIT
[Initial Consultation] : an initial pain management consultation [FreeTextEntry2] : Neck/low back pain

## 2024-04-11 ENCOUNTER — APPOINTMENT (OUTPATIENT)
Dept: ORTHOPEDIC SURGERY | Facility: CLINIC | Age: 69
End: 2024-04-11
Payer: MEDICARE

## 2024-04-11 PROCEDURE — 99214 OFFICE O/P EST MOD 30 MIN: CPT

## 2024-04-11 RX ORDER — TIZANIDINE 4 MG/1
4 TABLET ORAL 3 TIMES DAILY
Qty: 63 | Refills: 0 | Status: ACTIVE | COMMUNITY
Start: 2024-04-11 | End: 1900-01-01

## 2024-04-11 NOTE — HISTORY OF PRESENT ILLNESS
[Lower back] : lower back [Result of repetitive motion] : result of repetitive motion [7] : 7 [Burning] : burning [Shooting] : shooting [] : yes [Intermittent] : intermittent [Rest] : rest [de-identified] : The patient is a 68 year old women with complaint of lower back pain. She has back pain for the past several days. Her pain has worsened in the last 24 hours. She works in retail. The patient has pain with lifting weighted objects. Her pain is worsened with flexion. She reports muscles tightness. The patient reports intermittent paresthesia into the lower extremities. The patient notes that she has had a history of spinal issues in the past.

## 2024-04-11 NOTE — PHYSICAL EXAM
[NL (45)] : extension 45 degrees [NL (40)] : right lateral bending 40 degrees [5___] : left extensor hallicus longus 5[unfilled]/5 [] : full ROM with pain [FreeTextEntry1] : L5-S1 narrowing, loss of lordosis secondary to muscle spasm.  [TWNoteComboBox7] : forward flexion 75 degrees [de-identified] : extension 20 degrees [de-identified] : left lateral bending 30 degrees [de-identified] : left lateral rotation 30 degrees [de-identified] : right lateral bending 30 degrees [TWNoteComboBox6] : right lateral rotation 30 degrees

## 2024-04-11 NOTE — ASSESSMENT
[FreeTextEntry1] : The patient has lumbar spondylosis with radiculopathy.  The patient also has significant paralumbar muscle spasm.  She has loss of lumbar lordosis as seen on her spinal xrays.  The patient was prescribed tizanidine for muscle spasm.  We discussed the risks benefits and alternative to using the medication.  She has agreed to proceed with use of the medication. The patient was referred to Dr. GIAN Dwyer for consideration of TPI vs DANNI if applicable.  She will remain out of work until her follow up appointment with Dr. Dwyer.

## 2024-05-06 ENCOUNTER — APPOINTMENT (OUTPATIENT)
Dept: PAIN MANAGEMENT | Facility: CLINIC | Age: 69
End: 2024-05-06
Payer: MEDICARE

## 2024-05-06 VITALS — BODY MASS INDEX: 29.51 KG/M2 | HEIGHT: 67 IN | WEIGHT: 188 LBS

## 2024-05-06 DIAGNOSIS — M47.816 SPONDYLOSIS W/OUT MYELOPATHY OR RADICULOPATHY, LUMBAR REGION: ICD-10-CM

## 2024-05-06 DIAGNOSIS — M54.12 RADICULOPATHY, CERVICAL REGION: ICD-10-CM

## 2024-05-06 DIAGNOSIS — M47.812 SPONDYLOSIS W/OUT MYELOPATHY OR RADICULOPATHY, CERVICAL REGION: ICD-10-CM

## 2024-05-06 PROCEDURE — 99213 OFFICE O/P EST LOW 20 MIN: CPT

## 2024-05-06 RX ORDER — MELOXICAM 15 MG/1
15 TABLET ORAL
Qty: 30 | Refills: 1 | Status: DISCONTINUED | COMMUNITY
Start: 2024-03-07 | End: 2024-05-06

## 2024-05-06 NOTE — ASSESSMENT
[FreeTextEntry1] : A discussion regarding available pain management treatment options occurred with the patient.  These included interventional, rehabilitative, pharmacological, and alternative modalities. We will proceed with the following:    Interventional treatment options:   - Once again discussed trial of lumbar facet MBB/RFA for ongoing axial low back pain - At this time given the diffuse nature of her spinal pain I advised that she hold off until able to localize her symptoms - She would be candidate for a cervical epidural steroid injection for her radicular arm pain however patient is unable to have corticosteroid (previous severe exacerbations of OCD and other psychological negative effects) - see additional instructions below    Rehabilitative options:   - Patient failed previous trials of physical therapy; she defers further - participation in active HEP was discussed and encouraged as tolerated  Medication based treatment options:   - Increase Lyrica 75 mg BID; further titration based on clinical response - continue Tylenol 500-1000 mg up to TID as needed - Continue tizanidine 2-4 mg up to TID as needed for spasm - Advised against use of NSAID therapy given reported bleeding effects - see additional instructions below    Complementary treatment options:   - Weight management and lifestyle modifications discussed   - Patient considering retrial of chiropractic care  Additional treatment recommendations as follows:   - Patient will follow-up in 3 months or as needed basis  The documentation recorded by the scribe, in my presence, accurately reflects the service I personally performed and the decisions made by me with my edits as appropriate.   I, Miller Briceno acting as scribe, attest that this documentation has been prepared under the direction and in the presence of Provider Roby Dwyer DO.

## 2024-05-06 NOTE — PHYSICAL EXAM
[de-identified] : Constitutional:   - No acute distress   - Well developed; well nourished    Neurological:   - normal mood and affect   - alert and oriented x 3     Cardiovascular:   - grossly normal   Cervical Spine Exam:   Inspection:   erythema (-)   ecchymosis (-)   rashes (-)    Palpation:                                                    Cervical paraspinal tenderness:         R (-); L(-)  Upper trapezius tenderness:              R (-); L (-)  Rhomboids tenderness:                      R (-); L (-)  Occipital Ridge:                                    R (-); L (-)  Supraspinatus tenderness:                 R (-); L (-)   ROM: WNL crepitus throughout ROM testing  Strength Testing:              Deltoid                           R (5/5); L (5/5)  Biceps:                          R (5/5); L (5/5)  Triceps:                         R (5/5); L (5/5)  Finger Abductors:         R (5/5); L (5/5)  Grasp:                           R (5/5); L (5/5)   Special Testing:  Spurling Test:                  R (+); L (-)  Facet load test:               R (+); L (+)   Neuro:  SILT throughout right upper extremity  SILT throughout left upper extremity   Reflexes:  Biceps   -           R (2+); L (2+)  Triceps  -           R (2+); L (2+)  Brachioradialis- R (2+); L (2+)     No ankle clonus   -------------------------------------------------------------------  Lumbar Spine Exam:   Inspection:  erythema (-)  ecchymosis (-)  rashes (-)  alignment: no scoliosis   Palpation:  Midline lumbar tenderness:            (-)  midline thoracic tenderness:          (-)  Lumbar paraspinal tenderness:  L (-) ; R (-)  thoracic paraspinal tenderness: L (-) ; R (-)  sciatic nerve tenderness :          L (-) ; R (-)  SI joint tenderness:                     L (-) ; R (-)  GTB tenderness:                        L (-);  R (-)   ROM: WNL; stiffness throughout ROM testing  Strength:                                     Right       Left     Hip Flexion:                (5/5)       (5/5)  Quadriceps:               (5/5)       (5/5)  Hamstrings:                (5/5)       (5/5)  Ankle Dorsiflexion:     (5/5)       (5/5)  EHL:                           (5/5)       (5/5)  Ankle Plantarflexion:  (5/5)       (5/5)   Special Tests:  SLR:                            R (=) ; L (=)  Facet loading:             R (+) ; L (+)  VERONICA test:                R (-) ; L (-)  Hamstring tightness:   R (-);  L (-)   Neurologic:  SILT throughout right lower extremity  SILT throughout left lower extremity   Reflexes normal and symmetric bilateral lower extremities   Gait:  non- antalgic gait  ambulates without assistive device

## 2024-05-06 NOTE — HISTORY OF PRESENT ILLNESS
[Neck] : neck [Upper back] : upper back [Mid-back] : mid-back [Lower back] : lower back [Sudden] : sudden [5] : 5 [Dull/Aching] : dull/aching [Intermittent] : intermittent [FreeTextEntry1] : 2024 - Patient presents for 4-week FUV regarding her neck and lower back pain.   Patient reports relief of her symptoms with 75 mg Lyrica.  Was unable to titrate beyond this dose due to sedating effects.  She is also on tizanidine 4 mg.  Still unable to necessarily localize her worst symptoms to the neck or low back; symptoms remain fairly diffuse.  2024 - The patient presents for initial evaluation regarding their neck pain and low back pain.  Patient was seen approxi-1 month ago in urgent care and referred over.  Patient complains of multifocal pain along the entire spine and upper extremities, her main complaint today is her mid back pain. Patient has a history of GI ulcers and had rectal bleeding while on Meloxicam which resolved with discontinued use, she still uses Tylenol and Ibuprofen PRN for pain management; she also has an "allergy" to corticosteroids. Patient has completed PT trials with no perceived benefit, and has received chiropractic care with good relief s/p.   Injections: No    Pertinent Surgical History: N/A   Imagin) MRI Cervical Spine (3/7/2024) - [facility]   2) MRI Lumbar Spine (3/7/2024) - [facility]  Physician Disclaimer: I have personally reviewed and confirmed all HPI data with the patient.  [] : Patient is currently injured and not playing sports: no [de-identified] : [facility] and lumbar mri at [facility]

## 2024-06-10 ENCOUNTER — APPOINTMENT (OUTPATIENT)
Dept: PAIN MANAGEMENT | Facility: CLINIC | Age: 69
End: 2024-06-10

## 2024-06-27 ENCOUNTER — APPOINTMENT (OUTPATIENT)
Dept: CARDIOLOGY | Facility: CLINIC | Age: 69
End: 2024-06-27
Payer: MEDICARE

## 2024-06-27 ENCOUNTER — NON-APPOINTMENT (OUTPATIENT)
Age: 69
End: 2024-06-27

## 2024-06-27 VITALS
BODY MASS INDEX: 30.07 KG/M2 | DIASTOLIC BLOOD PRESSURE: 78 MMHG | SYSTOLIC BLOOD PRESSURE: 114 MMHG | OXYGEN SATURATION: 97 % | WEIGHT: 192 LBS | HEART RATE: 96 BPM

## 2024-06-27 DIAGNOSIS — I34.1 NONRHEUMATIC MITRAL (VALVE) PROLAPSE: ICD-10-CM

## 2024-06-27 DIAGNOSIS — M51.36 OTHER INTERVERTEBRAL DISC DEGENERATION, LUMBAR REGION: ICD-10-CM

## 2024-06-27 DIAGNOSIS — I10 ESSENTIAL (PRIMARY) HYPERTENSION: ICD-10-CM

## 2024-06-27 DIAGNOSIS — E78.5 HYPERLIPIDEMIA, UNSPECIFIED: ICD-10-CM

## 2024-06-27 PROCEDURE — 99214 OFFICE O/P EST MOD 30 MIN: CPT

## 2024-06-27 PROCEDURE — 93000 ELECTROCARDIOGRAM COMPLETE: CPT

## 2024-07-09 RX ORDER — AMLODIPINE BESYLATE 2.5 MG/1
2.5 TABLET ORAL
Qty: 90 | Refills: 3 | Status: ACTIVE | COMMUNITY
Start: 1900-01-01 | End: 1900-01-01

## 2024-07-09 RX ORDER — LOSARTAN POTASSIUM 100 MG/1
100 TABLET, FILM COATED ORAL DAILY
Qty: 90 | Refills: 3 | Status: ACTIVE | COMMUNITY
Start: 1900-01-01 | End: 1900-01-01

## 2024-08-02 ENCOUNTER — APPOINTMENT (OUTPATIENT)
Dept: CARDIOLOGY | Facility: CLINIC | Age: 69
End: 2024-08-02
Payer: MEDICARE

## 2024-08-02 VITALS
DIASTOLIC BLOOD PRESSURE: 70 MMHG | HEIGHT: 67 IN | SYSTOLIC BLOOD PRESSURE: 120 MMHG | OXYGEN SATURATION: 99 % | BODY MASS INDEX: 30.61 KG/M2 | HEART RATE: 82 BPM | WEIGHT: 195 LBS

## 2024-08-02 DIAGNOSIS — I10 ESSENTIAL (PRIMARY) HYPERTENSION: ICD-10-CM

## 2024-08-02 DIAGNOSIS — E78.5 HYPERLIPIDEMIA, UNSPECIFIED: ICD-10-CM

## 2024-08-02 DIAGNOSIS — I34.1 NONRHEUMATIC MITRAL (VALVE) PROLAPSE: ICD-10-CM

## 2024-08-02 PROCEDURE — 93306 TTE W/DOPPLER COMPLETE: CPT

## 2024-08-02 PROCEDURE — 99213 OFFICE O/P EST LOW 20 MIN: CPT

## 2024-08-04 NOTE — REASON FOR VISIT
[CV Risk Factors and Non-Cardiac Disease] : CV risk factors and non-cardiac disease [Structural Heart and Valve Disease] : structural heart and valve disease [Hyperlipidemia] : hyperlipidemia [Hypertension] : hypertension [FreeTextEntry3] : Dr. Angelita Gonzalez [FreeTextEntry1] : Patient very pleasant 68-year-old female who presents to the office today to review her echocardiogram which was recommended at time of her original visit on June 27.  Patient previously was under the care of Dr. Chris Rivero back in [facility].  She was seeing him for hypertension, hyperlipidemia, previously diagnosed mitral valve prolapse and has been on antihypertensive therapy, statin therapy as well as aspirin.  Patient presents originally to transfer care as she is moved out [facility] and at that time  she has no new or active cardiac symptomatology.  At the time of her initial visit her major complaint was difficulty losing weight as she has had multiple orthopedic spine issues with multiple herniated disks.  While active however she denies chest pain, shortness of breath, palpitation or dizziness and she presents today to continue her ongoing cardiac care here as she has moved to the [place]

## 2024-08-04 NOTE — DISCUSSION/SUMMARY
[FreeTextEntry1] : Patient very pleasant 68-year-old female with a longstanding history of hypertension.  She has known mitral prolapse which was only able to be confirmed as only a trace mitral regurgitation on today's echocardiogram.  LV function was preserved and her mitral valve appeared to be structurally normal.  Left atrium is normal size she was reassured based on these echo findings of her structural stability.  Patient was recommended to initiate an aggressive weight loss program, time was spent talking to her about restricting carbohydrates initiating exercise program that will burn calories in excess of calories consumed.  Patient was recommended to return to the office for follow-up in October.  Patient's tolerating her amlodipine, blood pressure is ideal she is having no side effects.  Joel Goldberg, MD, FACC

## 2024-08-04 NOTE — CARDIOLOGY SUMMARY
[de-identified] : (8/2/2024) ECHOCARDIOGRAPHIC CONCLUSIONS:  1. Technically difficult image quality. 2. Left ventricular systolic function is normal with an ejection fraction visually estimated at 60 to 65 %. 3. Normal left ventricular diastolic function. 4. Normal right ventricular cavity size and normal right ventricular systolic function. 5. Normal left and right atrial size. 6. Trace mitral regurgitation. 7. Trace tricuspid regurgitation. 8. Estimated pulmonary artery systolic pressure is 17 mmHg. 9. Trace pulmonic regurgitation. 10. No pericardial effusion seen. 11. No prior echocardiogram is available for comparison.

## 2024-08-04 NOTE — REASON FOR VISIT
[CV Risk Factors and Non-Cardiac Disease] : CV risk factors and non-cardiac disease [Structural Heart and Valve Disease] : structural heart and valve disease [Hyperlipidemia] : hyperlipidemia [Hypertension] : hypertension [FreeTextEntry3] : Dr. Angelita Gonzalez [FreeTextEntry1] : Patient very pleasant 68-year-old female who presents to the office today to review her echocardiogram which was recommended at time of her original visit on June 27.  Patient previously was under the [facility]  She was seeing him for hypertension, hyperlipidemia, previously diagnosed mitral valve prolapse and has been on antihypertensive therapy, statin therapy as well as aspirin.  Patient presents originally to transfer care as she is moved out [facility] and at that time  she has no new or active cardiac symptomatology.  At the time of her initial visit her major complaint was difficulty losing weight as she has had multiple orthopedic spine issues with multiple herniated disks.  While active however she denies chest pain, shortness of breath, palpitation or dizziness and she presents today to continue her ongoing cardiac care here as she has moved to the [place]

## 2024-08-04 NOTE — HISTORY OF PRESENT ILLNESS
[FreeTextEntry1] : Patient is a very pleasant 68-year-old female with a longstanding history of hypertension, currently on losartan//25 and has been treated for hypertension for 18 years. She has known hyperlipidemia, mitral valve prolapse and has been seeing Dr. Chris Rivero on an annual basis. She has undergone stress testing, echocardiography which was consistent with mitral prolapse as well as duplex scans of her carotid arteries in the past with Dr. Rivero.  She has never had a cardiac event, she looks and feels well today but is inactive for multiple orthopedic issues mostly herniated disks.  Patient has chronic weight problems but has not incorporated or committed to long-term diet and exercise plan. She is interested in weight loss medications but willing to commit to diet and exercise to see where she can get in the near future as her preference at this time.  Patient presented initially otherwise looking and feeling well, she denied dyspnea on exertion, has no predictable or provokable chest pain, no palpitations, she has been diagnosed with having mitral prolapse in the past and is echocardiography on a yearly basis. She is taking and tolerating all of her medications but recently her antihypertensive medication was titrated upwards with the addition of 25 mg of hydrochlorothiazide.

## 2024-08-04 NOTE — CARDIOLOGY SUMMARY
[de-identified] : (8/2/2024) ECHOCARDIOGRAPHIC CONCLUSIONS:  1. Technically difficult image quality. 2. Left ventricular systolic function is normal with an ejection fraction visually estimated at 60 to 65 %. 3. Normal left ventricular diastolic function. 4. Normal right ventricular cavity size and normal right ventricular systolic function. 5. Normal left and right atrial size. 6. Trace mitral regurgitation. 7. Trace tricuspid regurgitation. 8. Estimated pulmonary artery systolic pressure is 17 mmHg. 9. Trace pulmonic regurgitation. 10. No pericardial effusion seen. 11. No prior echocardiogram is available for comparison.

## 2024-08-04 NOTE — REVIEW OF SYSTEMS
[Dyspnea on exertion] : dyspnea during exertion [Joint Pain] : joint pain [Negative] : Psychiatric [SOB] : no shortness of breath [Chest Discomfort] : no chest discomfort [Lower Ext Edema] : no extremity edema [Leg Claudication] : no intermittent leg claudication [Palpitations] : no palpitations [Orthopnea] : no orthopnea [PND] : no PND [Syncope] : no syncope

## 2024-08-08 ENCOUNTER — APPOINTMENT (OUTPATIENT)
Dept: PAIN MANAGEMENT | Facility: CLINIC | Age: 69
End: 2024-08-08

## 2024-08-24 ENCOUNTER — APPOINTMENT (OUTPATIENT)
Dept: AFTER HOURS CARE | Facility: EMERGENCY ROOM | Age: 69
End: 2024-08-24

## 2024-08-24 RX ORDER — HYDROCHLOROTHIAZIDE 25 MG/1
25 TABLET ORAL
Qty: 90 | Refills: 3 | Status: ACTIVE | COMMUNITY
Start: 2024-08-24

## 2024-09-12 ENCOUNTER — APPOINTMENT (OUTPATIENT)
Dept: CARDIOLOGY | Facility: CLINIC | Age: 69
End: 2024-09-12
Payer: MEDICARE

## 2024-09-12 ENCOUNTER — NON-APPOINTMENT (OUTPATIENT)
Age: 69
End: 2024-09-12

## 2024-09-12 VITALS
WEIGHT: 195 LBS | HEART RATE: 92 BPM | OXYGEN SATURATION: 97 % | SYSTOLIC BLOOD PRESSURE: 100 MMHG | DIASTOLIC BLOOD PRESSURE: 80 MMHG | BODY MASS INDEX: 30.54 KG/M2

## 2024-09-12 DIAGNOSIS — I10 ESSENTIAL (PRIMARY) HYPERTENSION: ICD-10-CM

## 2024-09-12 DIAGNOSIS — E78.5 HYPERLIPIDEMIA, UNSPECIFIED: ICD-10-CM

## 2024-09-12 DIAGNOSIS — I34.1 NONRHEUMATIC MITRAL (VALVE) PROLAPSE: ICD-10-CM

## 2024-09-12 DIAGNOSIS — R07.89 OTHER CHEST PAIN: ICD-10-CM

## 2024-09-12 PROCEDURE — 99214 OFFICE O/P EST MOD 30 MIN: CPT

## 2024-09-12 PROCEDURE — 93000 ELECTROCARDIOGRAM COMPLETE: CPT

## 2024-09-12 RX ORDER — PROPRANOLOL HYDROCHLORIDE 10 MG/1
10 TABLET ORAL
Qty: 180 | Refills: 1 | Status: ACTIVE | COMMUNITY
Start: 2024-09-12 | End: 1900-01-01

## 2024-09-16 NOTE — CARDIOLOGY SUMMARY
[de-identified] : (9/12/2024) EKG NSR at 87 beats minute with a frontal QRS axis +15 degrees; remainder the trace within normal limits. [de-identified] : (8/2/2024) ECHOCARDIOGRAPHIC CONCLUSIONS:  1. Technically difficult image quality. 2. Left ventricular systolic function is normal with an ejection fraction visually estimated at 60 to 65 %. 3. Normal left ventricular diastolic function. 4. Normal right ventricular cavity size and normal right ventricular systolic function. 5. Normal left and right atrial size. 6. Trace mitral regurgitation. 7. Trace tricuspid regurgitation. 8. Estimated pulmonary artery systolic pressure is 17 mmHg. 9. Trace pulmonic regurgitation. 10. No pericardial effusion seen. 11. No prior echocardiogram is available for comparison.

## 2024-09-16 NOTE — REASON FOR VISIT
[CV Risk Factors and Non-Cardiac Disease] : CV risk factors and non-cardiac disease [Coronary Artery Disease] : coronary artery disease [Other: ____] : [unfilled] [Structural Heart and Valve Disease] : structural heart and valve disease [FreeTextEntry3] : Dr. Angelita Gonzalez [FreeTextEntry1] : Patient very pleasant 68-year-old female who presents to the office today after recent overnight stay in the [facility] where she underwent stress echocardiography after she presented with atypical chest pain.  She presented saying that she is having exertional dyspnea with mild chest pressure climbing stairs.  Despite a negative stress echo she is concerned about the symptoms and presents today for further evaluation and recommendation.  Patient has known mitral valve prolapse, at the time of her last visit it was to review and echocardiogram which was recommended at time of her original visit on June 27.  Patient previously was under the care of Dr. Chris Rivero back in [facility].  She was seeing him for hypertension, hyperlipidemia, previously diagnosed mitral valve prolapse and has been on antihypertensive therapy, statin therapy as well as aspirin.  While in [place] her amlodipine was cut back for relatively low blood pressure but she was asymptomatic.  Patient presents today concerned of her continued symptoms despite negative testing and desires further recommendations and evaluation to definitively rule out symptoms representing ischemia.  Her lipids are ideal with an LDL of  98, HDL 64 total cholesterol 179 and ratio of 2.8.  Triglycerides are 79 and A1c is 5.7

## 2024-09-16 NOTE — PHYSICAL EXAM
[Well Developed] : well developed [Well Nourished] : well nourished [No Carotid Bruit] : no carotid bruit [Normal S1, S2] : normal S1, S2 [No Murmur] : no murmur [No Rub] : no rub [No Gallop] : no gallop [Normal] : normal gait [No Edema] : no edema [No Cyanosis] : no cyanosis [No Clubbing] : no clubbing [Normal Radial B/L] : normal radial B/L [Normal PT B/L] : normal PT B/L [Normal DP B/L] : normal DP B/L [de-identified] : Slightly overweight [de-identified] : No palpable thyroid [de-identified] : Soft midsystolic click

## 2024-09-16 NOTE — CARDIOLOGY SUMMARY
[de-identified] : (9/12/2024) EKG NSR at 87 beats minute with a frontal QRS axis +15 degrees; remainder the trace within normal limits. [de-identified] : (8/2/2024) ECHOCARDIOGRAPHIC CONCLUSIONS:  1. Technically difficult image quality. 2. Left ventricular systolic function is normal with an ejection fraction visually estimated at 60 to 65 %. 3. Normal left ventricular diastolic function. 4. Normal right ventricular cavity size and normal right ventricular systolic function. 5. Normal left and right atrial size. 6. Trace mitral regurgitation. 7. Trace tricuspid regurgitation. 8. Estimated pulmonary artery systolic pressure is 17 mmHg. 9. Trace pulmonic regurgitation. 10. No pericardial effusion seen. 11. No prior echocardiogram is available for comparison.

## 2024-09-16 NOTE — REASON FOR VISIT
[CV Risk Factors and Non-Cardiac Disease] : CV risk factors and non-cardiac disease [Coronary Artery Disease] : coronary artery disease [Other: ____] : [unfilled] [Structural Heart and Valve Disease] : structural heart and valve disease [FreeTextEntry3] : Dr. Angelita Gonzalez [FreeTextEntry1] : Patient very pleasant 68-year-old female who presents to the office today after recent overnight stay in the [facility] where she underwent stress echocardiography after she presented with atypical chest pain.  She presented saying that she is having exertional dyspnea with mild chest pressure climbing stairs.  Despite a negative stress echo she is concerned about the symptoms and presents today for further evaluation and recommendation.  Patient has known mitral valve prolapse, at the time of her last visit it was to review and echocardiogram which was recommended at time of her original visit on June 27.  Patient previously was under the care of Dr. Chris Rivero back in [facility].  She was seeing him for hypertension, hyperlipidemia, previously diagnosed mitral valve prolapse and has been on antihypertensive therapy, statin therapy as well as aspirin.  While in [place] her amlodipine was cut back for relatively low blood pressure but she was asymptomatic.  Patient presents today concerned of her continued symptoms despite negative testing and desires further recommendations and evaluation to definitively rule out symptoms representing ischemia.  Her lipids are ideal with an LDL of  98, HDL 64 total cholesterol 179 and ratio of 2.8.  Triglycerides are 79 and A1c is 5.7

## 2024-09-16 NOTE — REVIEW OF SYSTEMS
[Dyspnea on exertion] : dyspnea during exertion [Joint Pain] : joint pain [Negative] : Psychiatric [Chest Discomfort] : chest discomfort [SOB] : no shortness of breath [Lower Ext Edema] : no extremity edema [Leg Claudication] : no intermittent leg claudication [Palpitations] : no palpitations [Orthopnea] : no orthopnea [PND] : no PND [Syncope] : no syncope

## 2024-09-16 NOTE — DISCUSSION/SUMMARY
[EKG obtained to assist in diagnosis and management of assessed problem(s)] : EKG obtained to assist in diagnosis and management of assessed problem(s) [FreeTextEntry1] : Patient very pleasant 68-year-old female with a significant level of anxiety over symptomatology that occurred 2 weeks ago with significant breathlessness climbing stairs to the point where she had to sit down and rest.  She has a longstanding history of hypertension.  She has known mitral valve prolapse which was only able to be confirmed as only a trace mitral regurgitation on her echo in early August.  Her lLV function was preserved and her mitral valve appeared to be structurally normal.  Left atrium is normal size she was reassured based on these echo findings of her structural stability.  Patient was recommended to schedule a CT angiogram  to definitively rule out any type of flow-limiting lesion to account for her symptomatology.    At the time of her last visit she had noted as she recently had initiate an aggressive weight loss program, time was spent talking to her about restricting carbohydrates initiating exercise program that will burn calories in excess of calories consumed.  Patient was recommended to return to the office for follow-up in October but we will evaluate with a CTA first.  No other changes were made to her medical regimen at this time as she is. tolerating her amlodipine, blood pressure is ideal she is having no side effects.  Joel Goldberg, MD, FACC

## 2024-09-16 NOTE — PHYSICAL EXAM
[Well Developed] : well developed [Well Nourished] : well nourished [No Carotid Bruit] : no carotid bruit [Normal S1, S2] : normal S1, S2 [No Murmur] : no murmur [No Rub] : no rub [No Gallop] : no gallop [Normal] : normal gait [No Edema] : no edema [No Cyanosis] : no cyanosis [No Clubbing] : no clubbing [Normal Radial B/L] : normal radial B/L [Normal PT B/L] : normal PT B/L [Normal DP B/L] : normal DP B/L [de-identified] : Slightly overweight [de-identified] : No palpable thyroid [de-identified] : Soft midsystolic click

## 2024-09-16 NOTE — HISTORY OF PRESENT ILLNESS
[FreeTextEntry1] : Patient is a very pleasant 68-year-old female with a longstanding history of hypertension, currently on losartan//25 and has been treated for hypertension for 18 years. She has known hyperlipidemia, mitral valve prolapse and has been seeing Dr. Chris Rivero on an annual basis. She has undergone stress testing, echocardiography but presents noting 2 weeks ago she called with symptoms of being out of breath walking 1 flight she had she does walk 2 miles 3 times a week she does note a tightness on and off in her chest but is not predictably reproducible on a regular basis.  In the past she was informed that her symptoms were consistent with mitral valve prolapse as well as duplex scans of her carotid arteries in the past with Dr. Rivero. She has never had a cardiac event, she looks and feels well today but is inactive for multiple orthopedic issues mostly herniated disks.  Patient has chronic weight problems but has not incorporated or committed to long-term diet and exercise plan. She is interested in weight loss medications but willing to commit to diet and exercise to see where she can get in the near future as her preference at this time.  Patient presented initially otherwise looking and feeling well, she denied dyspnea on exertion, has no predictable or provokable chest pain but recent continued symptomatology with negative testing.  She has no palpitations, she has been diagnosed with having mitral valve prolapse in the past and is echocardiography (see cardiology summary)on a yearly basis. She is taking and tolerating all of her medications but recently her antihypertensive medication was titrated upwards with the addition of 25 mg of hydrochlorothiazide.

## 2024-09-19 RX ORDER — DIPHENHYDRAMINE HCL 2 %
25 CREAM (GRAM) TOPICAL
Refills: 0 | Status: ACTIVE | COMMUNITY

## 2024-09-20 RX ORDER — PREDNISONE 50 MG/1
50 TABLET ORAL
Qty: 3 | Refills: 0 | Status: ACTIVE | COMMUNITY
Start: 1900-01-01 | End: 1900-01-01

## 2024-10-17 ENCOUNTER — APPOINTMENT (OUTPATIENT)
Dept: CARDIOLOGY | Facility: CLINIC | Age: 69
End: 2024-10-17
Payer: MEDICARE

## 2024-10-17 VITALS
BODY MASS INDEX: 29.76 KG/M2 | WEIGHT: 190 LBS | OXYGEN SATURATION: 96 % | SYSTOLIC BLOOD PRESSURE: 100 MMHG | HEART RATE: 85 BPM | DIASTOLIC BLOOD PRESSURE: 60 MMHG

## 2024-10-17 DIAGNOSIS — E78.5 HYPERLIPIDEMIA, UNSPECIFIED: ICD-10-CM

## 2024-10-17 DIAGNOSIS — I34.1 NONRHEUMATIC MITRAL (VALVE) PROLAPSE: ICD-10-CM

## 2024-10-17 DIAGNOSIS — R07.89 OTHER CHEST PAIN: ICD-10-CM

## 2024-10-17 DIAGNOSIS — I10 ESSENTIAL (PRIMARY) HYPERTENSION: ICD-10-CM

## 2024-10-17 PROCEDURE — 99213 OFFICE O/P EST LOW 20 MIN: CPT

## 2024-12-10 ENCOUNTER — APPOINTMENT (OUTPATIENT)
Dept: OBGYN | Facility: CLINIC | Age: 69
End: 2024-12-10
Payer: MEDICARE

## 2024-12-10 VITALS
HEIGHT: 67 IN | SYSTOLIC BLOOD PRESSURE: 118 MMHG | WEIGHT: 195 LBS | BODY MASS INDEX: 30.61 KG/M2 | DIASTOLIC BLOOD PRESSURE: 68 MMHG

## 2024-12-10 DIAGNOSIS — Z01.419 ENCOUNTER FOR GYNECOLOGICAL EXAMINATION (GENERAL) (ROUTINE) W/OUT ABNORMAL FINDINGS: ICD-10-CM

## 2024-12-10 DIAGNOSIS — Z13.820 ENCOUNTER FOR SCREENING FOR OSTEOPOROSIS: ICD-10-CM

## 2024-12-10 DIAGNOSIS — Z12.39 ENCOUNTER FOR OTHER SCREENING FOR MALIGNANT NEOPLASM OF BREAST: ICD-10-CM

## 2024-12-10 DIAGNOSIS — N95.2 POSTMENOPAUSAL ATROPHIC VAGINITIS: ICD-10-CM

## 2024-12-10 DIAGNOSIS — Z00.00 ENCOUNTER FOR GENERAL ADULT MEDICAL EXAMINATION W/OUT ABNORMAL FINDINGS: ICD-10-CM

## 2024-12-10 PROCEDURE — G0101: CPT

## 2024-12-10 PROCEDURE — 99213 OFFICE O/P EST LOW 20 MIN: CPT | Mod: 25

## 2024-12-10 RX ORDER — ESTRADIOL 10 UG/1
10 TABLET, FILM COATED VAGINAL
Qty: 45 | Refills: 1 | Status: ACTIVE | COMMUNITY
Start: 2024-12-10 | End: 1900-01-01

## 2024-12-18 LAB — CYTOLOGY CVX/VAG DOC THIN PREP: ABNORMAL

## 2025-01-16 ENCOUNTER — NON-APPOINTMENT (OUTPATIENT)
Age: 70
End: 2025-01-16

## 2025-01-16 ENCOUNTER — APPOINTMENT (OUTPATIENT)
Dept: CARDIOLOGY | Facility: CLINIC | Age: 70
End: 2025-01-16
Payer: MEDICARE

## 2025-01-16 VITALS
HEART RATE: 87 BPM | WEIGHT: 201 LBS | SYSTOLIC BLOOD PRESSURE: 116 MMHG | DIASTOLIC BLOOD PRESSURE: 78 MMHG | BODY MASS INDEX: 31.48 KG/M2 | OXYGEN SATURATION: 94 %

## 2025-01-16 DIAGNOSIS — R07.89 OTHER CHEST PAIN: ICD-10-CM

## 2025-01-16 DIAGNOSIS — I10 ESSENTIAL (PRIMARY) HYPERTENSION: ICD-10-CM

## 2025-01-16 DIAGNOSIS — I34.1 NONRHEUMATIC MITRAL (VALVE) PROLAPSE: ICD-10-CM

## 2025-01-16 PROCEDURE — 99214 OFFICE O/P EST MOD 30 MIN: CPT

## 2025-01-16 PROCEDURE — 93000 ELECTROCARDIOGRAM COMPLETE: CPT

## 2025-01-16 RX ORDER — TRETINOIN 0.5 MG/G
0.05 CREAM TOPICAL
Refills: 0 | Status: ACTIVE | COMMUNITY

## 2025-03-04 ENCOUNTER — APPOINTMENT (OUTPATIENT)
Dept: ANTEPARTUM | Facility: CLINIC | Age: 70
End: 2025-03-04
Payer: MEDICARE

## 2025-03-04 ENCOUNTER — ASOB RESULT (OUTPATIENT)
Age: 70
End: 2025-03-04

## 2025-03-04 PROCEDURE — 76856 US EXAM PELVIC COMPLETE: CPT | Mod: 59

## 2025-03-04 PROCEDURE — 76830 TRANSVAGINAL US NON-OB: CPT

## 2025-03-10 ENCOUNTER — RX RENEWAL (OUTPATIENT)
Age: 70
End: 2025-03-10

## 2025-03-30 NOTE — ASU PATIENT PROFILE, ADULT - PSH
no History of facelift  2013  S/P bilateral breast reduction  09/2018  S/P bunionectomy  left foot 2012  S/P Hysterectomy  2006  S/P Tonsillectomy  1962  S/P Tubal Ligation  1996

## 2025-04-08 ENCOUNTER — NON-APPOINTMENT (OUTPATIENT)
Age: 70
End: 2025-04-08

## 2025-04-10 ENCOUNTER — APPOINTMENT (OUTPATIENT)
Age: 70
End: 2025-04-10
Payer: MEDICARE

## 2025-04-10 VITALS
BODY MASS INDEX: 30.98 KG/M2 | WEIGHT: 197.4 LBS | SYSTOLIC BLOOD PRESSURE: 125 MMHG | DIASTOLIC BLOOD PRESSURE: 77 MMHG | HEIGHT: 67 IN

## 2025-04-10 DIAGNOSIS — N95.2 POSTMENOPAUSAL ATROPHIC VAGINITIS: ICD-10-CM

## 2025-04-10 PROCEDURE — 99459 PELVIC EXAMINATION: CPT

## 2025-04-10 PROCEDURE — 99213 OFFICE O/P EST LOW 20 MIN: CPT

## 2025-05-22 ENCOUNTER — APPOINTMENT (OUTPATIENT)
Dept: CARDIOLOGY | Facility: CLINIC | Age: 70
End: 2025-05-22
Payer: MEDICARE

## 2025-05-22 ENCOUNTER — NON-APPOINTMENT (OUTPATIENT)
Age: 70
End: 2025-05-22

## 2025-05-22 VITALS
SYSTOLIC BLOOD PRESSURE: 124 MMHG | HEART RATE: 88 BPM | BODY MASS INDEX: 30.7 KG/M2 | DIASTOLIC BLOOD PRESSURE: 84 MMHG | OXYGEN SATURATION: 97 % | WEIGHT: 196 LBS

## 2025-05-22 DIAGNOSIS — I10 ESSENTIAL (PRIMARY) HYPERTENSION: ICD-10-CM

## 2025-05-22 DIAGNOSIS — E78.5 HYPERLIPIDEMIA, UNSPECIFIED: ICD-10-CM

## 2025-05-22 DIAGNOSIS — R07.89 OTHER CHEST PAIN: ICD-10-CM

## 2025-05-22 DIAGNOSIS — I34.1 NONRHEUMATIC MITRAL (VALVE) PROLAPSE: ICD-10-CM

## 2025-05-22 PROCEDURE — 93320 DOPPLER ECHO COMPLETE: CPT

## 2025-05-22 PROCEDURE — 99214 OFFICE O/P EST MOD 30 MIN: CPT

## 2025-05-22 PROCEDURE — 93351 STRESS TTE COMPLETE: CPT

## 2025-05-30 ENCOUNTER — APPOINTMENT (OUTPATIENT)
Dept: PHYSICAL MEDICINE AND REHAB | Facility: CLINIC | Age: 70
End: 2025-05-30
Payer: MEDICARE

## 2025-05-30 DIAGNOSIS — M54.41 LUMBAGO WITH SCIATICA, LEFT SIDE: ICD-10-CM

## 2025-05-30 DIAGNOSIS — M54.42 LUMBAGO WITH SCIATICA, LEFT SIDE: ICD-10-CM

## 2025-05-30 DIAGNOSIS — R73.03 PREDIABETES.: ICD-10-CM

## 2025-05-30 PROCEDURE — 99215 OFFICE O/P EST HI 40 MIN: CPT

## 2025-05-30 PROCEDURE — 72100 X-RAY EXAM L-S SPINE 2/3 VWS: CPT | Mod: 26

## 2025-05-30 RX ORDER — VALBENAZINE 40 MG/1
40 CAPSULE ORAL
Refills: 0 | Status: ACTIVE | COMMUNITY

## 2025-05-30 RX ORDER — MELOXICAM 15 MG/1
15 TABLET ORAL
Qty: 14 | Refills: 0 | Status: ACTIVE | COMMUNITY
Start: 2025-05-30 | End: 1900-01-01

## 2025-06-05 ENCOUNTER — RX RENEWAL (OUTPATIENT)
Age: 70
End: 2025-06-05

## 2025-06-09 ENCOUNTER — APPOINTMENT (OUTPATIENT)
Dept: ORTHOPEDIC SURGERY | Facility: CLINIC | Age: 70
End: 2025-06-09
Payer: MEDICARE

## 2025-06-09 VITALS — BODY MASS INDEX: 30.76 KG/M2 | HEIGHT: 67 IN | WEIGHT: 196 LBS

## 2025-06-09 PROBLEM — M54.16 BILATERAL LUMBAR RADICULOPATHY: Status: ACTIVE | Noted: 2025-06-09

## 2025-06-09 PROBLEM — M51.369 DDD (DEGENERATIVE DISC DISEASE), LUMBAR: Status: ACTIVE | Noted: 2024-03-07

## 2025-06-09 PROCEDURE — 99204 OFFICE O/P NEW MOD 45 MIN: CPT

## 2025-06-10 ENCOUNTER — APPOINTMENT (OUTPATIENT)
Dept: OBGYN | Facility: CLINIC | Age: 70
End: 2025-06-10
Payer: MEDICARE

## 2025-06-10 VITALS
HEIGHT: 67 IN | HEART RATE: 89 BPM | SYSTOLIC BLOOD PRESSURE: 131 MMHG | BODY MASS INDEX: 30.29 KG/M2 | DIASTOLIC BLOOD PRESSURE: 85 MMHG | WEIGHT: 193 LBS

## 2025-06-10 PROCEDURE — 99213 OFFICE O/P EST LOW 20 MIN: CPT

## 2025-06-10 RX ORDER — ESTRADIOL 0.1 MG/G
0.1 CREAM VAGINAL
Qty: 1 | Refills: 1 | Status: ACTIVE | COMMUNITY
Start: 2025-06-10 | End: 1900-01-01

## 2025-07-03 ENCOUNTER — APPOINTMENT (OUTPATIENT)
Dept: CARDIOLOGY | Facility: CLINIC | Age: 70
End: 2025-07-03
Payer: MEDICARE

## 2025-07-03 VITALS
HEART RATE: 98 BPM | HEIGHT: 67 IN | WEIGHT: 190 LBS | BODY MASS INDEX: 29.82 KG/M2 | DIASTOLIC BLOOD PRESSURE: 70 MMHG | OXYGEN SATURATION: 98 % | SYSTOLIC BLOOD PRESSURE: 120 MMHG

## 2025-07-03 PROCEDURE — 99214 OFFICE O/P EST MOD 30 MIN: CPT

## 2025-07-03 PROCEDURE — 93000 ELECTROCARDIOGRAM COMPLETE: CPT

## 2025-08-22 ENCOUNTER — RX RENEWAL (OUTPATIENT)
Age: 70
End: 2025-08-22

## 2025-08-26 ENCOUNTER — RESULT REVIEW (OUTPATIENT)
Age: 70
End: 2025-08-26

## 2025-08-28 ENCOUNTER — RX RENEWAL (OUTPATIENT)
Age: 70
End: 2025-08-28

## 2025-09-08 ENCOUNTER — APPOINTMENT (OUTPATIENT)
Dept: ORTHOPEDIC SURGERY | Facility: CLINIC | Age: 70
End: 2025-09-08

## 2025-09-12 ENCOUNTER — APPOINTMENT (OUTPATIENT)
Dept: CARDIOLOGY | Facility: CLINIC | Age: 70
End: 2025-09-12

## 2025-09-19 ENCOUNTER — APPOINTMENT (OUTPATIENT)
Dept: CARDIOLOGY | Facility: CLINIC | Age: 70
End: 2025-09-19
Payer: MEDICARE

## 2025-09-19 VITALS
BODY MASS INDEX: 37.89 KG/M2 | HEART RATE: 82 BPM | OXYGEN SATURATION: 97 % | HEIGHT: 60 IN | DIASTOLIC BLOOD PRESSURE: 60 MMHG | WEIGHT: 193 LBS | SYSTOLIC BLOOD PRESSURE: 112 MMHG

## 2025-09-19 DIAGNOSIS — I34.1 NONRHEUMATIC MITRAL (VALVE) PROLAPSE: ICD-10-CM

## 2025-09-19 DIAGNOSIS — R07.89 OTHER CHEST PAIN: ICD-10-CM

## 2025-09-19 DIAGNOSIS — E78.5 HYPERLIPIDEMIA, UNSPECIFIED: ICD-10-CM

## 2025-09-19 DIAGNOSIS — I10 ESSENTIAL (PRIMARY) HYPERTENSION: ICD-10-CM

## 2025-09-19 PROCEDURE — 99204 OFFICE O/P NEW MOD 45 MIN: CPT

## 2025-09-19 PROCEDURE — 93000 ELECTROCARDIOGRAM COMPLETE: CPT
